# Patient Record
Sex: MALE | Race: WHITE
[De-identification: names, ages, dates, MRNs, and addresses within clinical notes are randomized per-mention and may not be internally consistent; named-entity substitution may affect disease eponyms.]

---

## 2019-10-21 ENCOUNTER — HOSPITAL ENCOUNTER (OUTPATIENT)
Dept: HOSPITAL 84 - D.OPS | Age: 59
Discharge: HOME | End: 2019-10-21
Attending: INTERNAL MEDICINE
Payer: MEDICARE

## 2019-10-21 VITALS — HEIGHT: 69 IN | WEIGHT: 174.37 LBS | BODY MASS INDEX: 25.83 KG/M2

## 2019-10-21 VITALS — DIASTOLIC BLOOD PRESSURE: 72 MMHG | SYSTOLIC BLOOD PRESSURE: 128 MMHG

## 2019-10-21 DIAGNOSIS — Z12.11: ICD-10-CM

## 2019-10-21 DIAGNOSIS — K63.5: Primary | ICD-10-CM

## 2019-10-21 DIAGNOSIS — K57.90: ICD-10-CM

## 2019-10-21 DIAGNOSIS — K64.8: ICD-10-CM

## 2019-10-21 LAB
ERYTHROCYTE [DISTWIDTH] IN BLOOD BY AUTOMATED COUNT: 13.4 % (ref 11.5–14.5)
HCT VFR BLD CALC: 47.5 % (ref 42–54)
HGB BLD-MCNC: 16.5 G/DL (ref 13.5–17.5)
MCH RBC QN AUTO: 32.5 PG (ref 26–34)
MCHC RBC AUTO-ENTMCNC: 34.7 G/DL (ref 31–37)
MCV RBC: 93.7 FL (ref 80–100)
PLATELET # BLD: 250 10X3/UL (ref 130–400)
PMV BLD AUTO: 9.9 FL (ref 7.4–10.4)
RBC # BLD AUTO: 5.07 10X6/UL (ref 4.2–6.1)
WBC # BLD AUTO: 6.3 10X3/UL (ref 4.8–10.8)

## 2019-10-21 NOTE — NUR
PT CHINYERE FULL LIQUID TRAY WELL AND HAS ALREADY BEEN UP TO RESTROOM PT
STATES IS READY TO GO HOME.

## 2022-03-08 NOTE — OP
PATIENT NAME:  SATURNINO MCKAY                         MEDICAL RECORD: [id number]
:56                                             LOCATION:D.OPS          
                                                         ADMISSION DATE:        
SURGEON:  SÁNCHEZ VILLALOBOS DO             
 
 
DATE OF OPERATION:  10/21/2019
 
PROCEDURE:  Colonoscopy with polypectomy.
 
INDICATIONS FOR PROCEDURE:  Screening for colorectal cancer with a family
history positive for cancer of the GI tract in the patient's mother.
 
SCOPE:  Olympus video pediatric colonoscope.
 
MEDICATIONS:  Propofol 500 mg IV per anesthesia.
 
WITHDRAWAL TIME:  22 minutes.
 
ESTIMATED BLOOD LOSS:  Minimal.
 
COMPLICATIONS:  None.
 
FINDINGS:  Informed consent was given.  The patient was made comfortable with
the above medication.  After reaching an adequate level of sedation by slow IV
push, the patient was placed on his left side.  A digital rectal examination was
performed and was normal.  The endoscope was then advanced under direct
visualization through the rectum to the cecum, confirmed by the presence of the
appendiceal orifice and ileocecal valve.  The endoscope was slowly withdrawn. 
Mucosa was carefully examined.  Prep quality was good.  There were 4 polyps
visualized on today's examination.  One was in the cecum.  It was a benign
appearing mixed type polyp that measured approximately 8 mm in diameter.  It was
removed using EMR technique with an isotonic saline pillow, followed by snare
polypectomy.  In the transverse colon, there was a benign appearing sessile
polyp, which measured approximately 7-8 mm in diameter.  It was removed using a
hot snare in 1 piece and completely retrieved.  In the sigmoid colon, there was
a benign appearing sessile polyp, which measured approximately 5-6 mm in
diameter.  It was removed using a hot snare in 1 piece and completely retrieved.
 In the rectum, there was a benign appearing sessile polyp, which measured
approximately 4 mm in diameter, which was removed using a hot snare in 1 piece
and completely retrieved.  There was evidence of mild diverticulosis involving
the sigmoid colon.  Retroflexion was performed in the rectum with visualization
of grade I internal hemorrhoids without bleeding.  The endoscope was withdrawn
from the patient.  The patient tolerated the procedure well and there were no
complications.
 
IMPRESSION:
1.  Multiple polyps as described above, removed using a combination of EMR
technique and hot snare.
2.  Mild diverticulosis of the sigmoid colon.
3.  Grade I internal hemorrhoids without bleeding.
 
PLAN AND RECOMMENDATIONS:
1.  Discharge home when recovery parameters are met.
2.  Follow up biopsy specimen results.
3.  High fiber diet.
4.  Continue current medications.
5.  Recall colonoscopy in 2-3 years based on the number and types of polyps
 
 
 
OPERATIVE REPORT                               [id number]    SATURNINO MCKAY       
 
 
removed today with a family history figured in.
 
TRANSINT:[id number] Voice Confirmation ID: [id number] DOCUMENT ID: [id number]
                                           
                                           SÁNCHEZ VILLALOBOS DO             
 
 
 
Electronically Signed by SÁNCHEZ WHITE on 10/21/19 at 1559
 
 
 
 
 
 
 
 
 
 
 
 
 
 
 
 
 
 
 
 
 
 
 
 
 
 
 
 
 
 
 
 
 
 
 
 
 
 
CC:                                                             [id number]
DICTATION DATE: 10/21/19 1058     :     10/21/19 1230      [facility] 
                                                                      10/21/19
[facility]                                          
[address] [place] [address]
Statement Selected